# Patient Record
Sex: MALE | Race: WHITE
[De-identification: names, ages, dates, MRNs, and addresses within clinical notes are randomized per-mention and may not be internally consistent; named-entity substitution may affect disease eponyms.]

---

## 2018-08-02 ENCOUNTER — HOSPITAL ENCOUNTER (OUTPATIENT)
Dept: HOSPITAL 74 - JER | Age: 28
Setting detail: OBSERVATION
LOS: 2 days | Discharge: HOME | End: 2018-08-04
Attending: NURSE PRACTITIONER | Admitting: INTERNAL MEDICINE
Payer: MEDICARE

## 2018-08-02 VITALS — BODY MASS INDEX: 27.1 KG/M2

## 2018-08-02 DIAGNOSIS — D72.829: ICD-10-CM

## 2018-08-02 DIAGNOSIS — R11.2: ICD-10-CM

## 2018-08-02 DIAGNOSIS — E87.2: Primary | ICD-10-CM

## 2018-08-02 DIAGNOSIS — K52.9: ICD-10-CM

## 2018-08-02 DIAGNOSIS — R10.9: ICD-10-CM

## 2018-08-02 DIAGNOSIS — F11.90: ICD-10-CM

## 2018-08-02 DIAGNOSIS — F12.90: ICD-10-CM

## 2018-08-02 LAB
ALBUMIN SERPL-MCNC: 4.9 G/DL (ref 3.4–5)
ALP SERPL-CCNC: 121 U/L (ref 45–117)
ALT SERPL-CCNC: 31 U/L (ref 12–78)
ANION GAP SERPL CALC-SCNC: 13 MMOL/L (ref 8–16)
APPEARANCE UR: CLEAR
APTT BLD: 27.1 SECONDS (ref 25.2–36.5)
AST SERPL-CCNC: 11 U/L (ref 15–37)
BASOPHILS # BLD: 0.5 % (ref 0–2)
BILIRUB SERPL-MCNC: 0.3 MG/DL (ref 0.2–1)
BILIRUB UR STRIP.AUTO-MCNC: NEGATIVE MG/DL
BUN SERPL-MCNC: 9 MG/DL (ref 7–18)
CALCIUM SERPL-MCNC: 9.9 MG/DL (ref 8.5–10.1)
CHLORIDE SERPL-SCNC: 109 MMOL/L (ref 98–107)
CO2 SERPL-SCNC: 22 MMOL/L (ref 21–32)
COLOR UR: YELLOW
CREAT SERPL-MCNC: 0.9 MG/DL (ref 0.7–1.3)
DEPRECATED RDW RBC AUTO: 13.5 % (ref 11.9–15.9)
EOSINOPHIL # BLD: 0.1 % (ref 0–4.5)
GLUCOSE SERPL-MCNC: 122 MG/DL (ref 74–106)
HCT VFR BLD CALC: 43.5 % (ref 35.4–49)
HGB BLD-MCNC: 14.6 GM/DL (ref 11.7–16.9)
INR BLD: 1.06 (ref 0.83–1.09)
KETONES UR QL STRIP: (no result)
LEUKOCYTE ESTERASE UR QL STRIP.AUTO: NEGATIVE
LIPASE SERPL-CCNC: 90 U/L (ref 73–393)
LYMPHOCYTES # BLD: 9 % (ref 8–40)
MCH RBC QN AUTO: 29.7 PG (ref 25.7–33.7)
MCHC RBC AUTO-ENTMCNC: 33.5 G/DL (ref 32–35.9)
MCV RBC: 88.8 FL (ref 80–96)
MONOCYTES # BLD AUTO: 4.6 % (ref 3.8–10.2)
MUCOUS THREADS URNS QL MICRO: (no result)
NEUTROPHILS # BLD: 85.8 % (ref 42.8–82.8)
NITRITE UR QL STRIP: NEGATIVE
PH UR: 6 [PH] (ref 5–8)
PLATELET # BLD AUTO: 433 K/MM3 (ref 134–434)
PMV BLD: 8.2 FL (ref 7.5–11.1)
POTASSIUM SERPLBLD-SCNC: 4 MMOL/L (ref 3.5–5.1)
PROT SERPL-MCNC: 8.6 G/DL (ref 6.4–8.2)
PROT UR QL STRIP: (no result)
PROT UR QL STRIP: NEGATIVE
PT PNL PPP: 12 SEC (ref 9.7–13)
RBC # BLD AUTO: 4.9 M/MM3 (ref 4–5.6)
SODIUM SERPL-SCNC: 144 MMOL/L (ref 136–145)
SP GR UR: 1.03 (ref 1–1.03)
UROBILINOGEN UR STRIP-MCNC: NEGATIVE MG/DL (ref 0.2–1)
WBC # BLD AUTO: 19.5 K/MM3 (ref 4–10)

## 2018-08-02 PROCEDURE — G0378 HOSPITAL OBSERVATION PER HR: HCPCS

## 2018-08-02 NOTE — PDOC
Rapid Medical Evaluation


Chief Complaint: Pain, Acute


Time Seen by Provider: 08/02/18 18:22


Medical Evaluation: 


 Allergies











Allergy/AdvReac Type Severity Reaction Status Date / Time


 


No Known Allergies Allergy   Verified 08/02/18 18:18








I have performed a brief in-person evaluation of this patient. 


The patient presents with a chief complaint of:  abdominal pain today


Pertinent physical exam findings: +Markle sign.  +RLQ TTP


I have ordered the following: labs, UA, IV


The patient will proceed to the ED for further evaluation. 











**Discharge Disposition





- Diagnosis


 Abdominal pain








- Referrals





- Patient Instructions





- Post Discharge Activity

## 2018-08-02 NOTE — PDOC
Attending Attestation





- HPI


HPI: 





08/02/18 20:51


Patient is a 28 year old male with no significant past medical history who 

presents to the ED with complaints of diffuse abdominal pain that began earlier 

this evening. Patient reports experiencing gradually increased in intensity 

until he was no longer able to tolerate it, prompting him to come into the ED 

for further evaluation.  He reports experiencing associated symptoms of 

increased nausea, and multiple episodes of vomiting. 





Denies chest pain, Sob. Denies fevers,chill. Denies contact with sick 

individuals, out of state travelling. Denies diarrhea, constipation. Denies any 

other symptoms. 





Allergies: None


Social history: No smoking. No alcohol. No illicit drugs. 


Surgical history: 


PMD: None





Adult PE


General: +Conducted post morphine administration. +Sleeping. No distress. 


Well-nourished well-developed individual, 


HEENT: Throat: Normal, tonsils normal, no erythema or exudate


Neck: Supple, no meningeal signs, no lymphadenopathy


Eyes::Pupils equal reactive and round, extraocular motion intact


Chest: Nontender to palpation 


Cardiac: S1-S2 normal, regular rate and rhythm, no murmurs rubs or gallops


Respiratory: Lungs clear to auscultation bilateral


Abdomen: +Pain resolved.


Soft, nondistended, normal bowel sounds, nontender to palpation diffusely


Extremities: Warm, dry, no cyanosis, clubbing, or edema


Skin: No rashes


Neuro: Alert and oriented x3, nonfocal exam, grossly intact, normal gait


Psych: Normal mood and affect








<FcoJ Luis - Last Filed: 08/02/18 20:51>





- Resident


Resident Name: [facility]





- ED Attending Attestation


I have performed the following: I have examined & evaluated the patient, The 

case was reviewed & discussed with the resident, I agree w/resident's findings 

& plan





- Physicial Exam


PE: 





08/03/18 02:11


see HPI








- Medical Decision Making








08/02/18 22:32





A portion of this note was documented by scribe services under my direction. I 

have reviewed the details of the note, within reason, and agree with the 

documentation.  The case summary and management plan written by me. 





 Assessment and plan: This is a 28-year-old male who comes in complaining of 

abdominal pain. Patient was seen in conjunction with the resident. Patient at 

time of my exam will was feeling much better however he had also received 

morphine.





A workup was obtained including CAT scan of abdomen and pelvis which was 

negative.





Patient's labs were remarkable for an elevated white count with left shift and 

elevated lactic acid.


Patient hydrated with 2 L of normal saline  and lactic acid were repeated with 

improvement to 3.7 which is still abnormal


08/03/18 00:46


Reevaluation patient remains pain-free. Patient on third liter fluid 

posteriorly the flu PP the CBC and lactic acid.








08/03/18 02:08


Patient's lactic acid is still 3.6 so will bring patient into an observation 

telemetry bed and continue to hydrate overnight and hopefully patient will be 

able to be sent home tomorrow.


08/03/18 02:11





<Corinne Willoughby I - Last Filed: 08/03/18 02:12>

## 2018-08-03 LAB
ALBUMIN SERPL-MCNC: 4 G/DL (ref 3.4–5)
ALP SERPL-CCNC: 106 U/L (ref 45–117)
ALT SERPL-CCNC: 27 U/L (ref 12–78)
AMPHET UR-MCNC: NEGATIVE NG/ML
ANION GAP SERPL CALC-SCNC: 10 MMOL/L (ref 8–16)
ANION GAP SERPL CALC-SCNC: 8 MMOL/L (ref 8–16)
AST SERPL-CCNC: 10 U/L (ref 15–37)
BARBITURATES UR-MCNC: NEGATIVE NG/ML
BENZODIAZ UR SCN-MCNC: NEGATIVE NG/ML
BILIRUB SERPL-MCNC: 0.6 MG/DL (ref 0.2–1)
BUN SERPL-MCNC: 7 MG/DL (ref 7–18)
BUN SERPL-MCNC: 7 MG/DL (ref 7–18)
CALCIUM SERPL-MCNC: 8.9 MG/DL (ref 8.5–10.1)
CALCIUM SERPL-MCNC: 9 MG/DL (ref 8.5–10.1)
CHLORIDE SERPL-SCNC: 103 MMOL/L (ref 98–107)
CHLORIDE SERPL-SCNC: 106 MMOL/L (ref 98–107)
CO2 SERPL-SCNC: 26 MMOL/L (ref 21–32)
CO2 SERPL-SCNC: 26 MMOL/L (ref 21–32)
COCAINE UR-MCNC: NEGATIVE NG/ML
CREAT SERPL-MCNC: 0.8 MG/DL (ref 0.7–1.3)
CREAT SERPL-MCNC: 0.8 MG/DL (ref 0.7–1.3)
DEPRECATED RDW RBC AUTO: 13.5 % (ref 11.9–15.9)
GLUCOSE SERPL-MCNC: 119 MG/DL (ref 74–106)
GLUCOSE SERPL-MCNC: 91 MG/DL (ref 74–106)
HCT VFR BLD CALC: 38.2 % (ref 35.4–49)
HGB BLD-MCNC: 13.1 GM/DL (ref 11.7–16.9)
MAGNESIUM SERPL-MCNC: 1.9 MG/DL (ref 1.8–2.4)
MCH RBC QN AUTO: 30.4 PG (ref 25.7–33.7)
MCHC RBC AUTO-ENTMCNC: 34.3 G/DL (ref 32–35.9)
MCV RBC: 88.9 FL (ref 80–96)
METHADONE UR-MCNC: NEGATIVE NG/ML
OPIATES UR QL SCN: POSITIVE NG/ML
PCP UR QL SCN: NEGATIVE NG/ML
PHOSPHATE SERPL-MCNC: 3.4 MG/DL (ref 2.5–4.9)
PLATELET # BLD AUTO: 333 K/MM3 (ref 134–434)
PMV BLD: 8.2 FL (ref 7.5–11.1)
POTASSIUM SERPLBLD-SCNC: 3.6 MMOL/L (ref 3.5–5.1)
POTASSIUM SERPLBLD-SCNC: 3.7 MMOL/L (ref 3.5–5.1)
PROT SERPL-MCNC: 7.2 G/DL (ref 6.4–8.2)
RBC # BLD AUTO: 4.3 M/MM3 (ref 4–5.6)
SODIUM SERPL-SCNC: 139 MMOL/L (ref 136–145)
SODIUM SERPL-SCNC: 140 MMOL/L (ref 136–145)
WBC # BLD AUTO: 17.1 K/MM3 (ref 4–10)

## 2018-08-03 PROCEDURE — 3E0337Z INTRODUCTION OF ELECTROLYTIC AND WATER BALANCE SUBSTANCE INTO PERIPHERAL VEIN, PERCUTANEOUS APPROACH: ICD-10-PCS | Performed by: NURSE PRACTITIONER

## 2018-08-03 PROCEDURE — 3E033GC INTRODUCTION OF OTHER THERAPEUTIC SUBSTANCE INTO PERIPHERAL VEIN, PERCUTANEOUS APPROACH: ICD-10-PCS | Performed by: NURSE PRACTITIONER

## 2018-08-03 PROCEDURE — 3E03329 INTRODUCTION OF OTHER ANTI-INFECTIVE INTO PERIPHERAL VEIN, PERCUTANEOUS APPROACH: ICD-10-PCS | Performed by: NURSE PRACTITIONER

## 2018-08-03 PROCEDURE — 3E033NZ INTRODUCTION OF ANALGESICS, HYPNOTICS, SEDATIVES INTO PERIPHERAL VEIN, PERCUTANEOUS APPROACH: ICD-10-PCS | Performed by: NURSE PRACTITIONER

## 2018-08-03 RX ADMIN — SODIUM CHLORIDE SCH MLS/HR: 9 INJECTION, SOLUTION INTRAVENOUS at 17:29

## 2018-08-03 RX ADMIN — SODIUM CHLORIDE SCH MLS/HR: 9 INJECTION, SOLUTION INTRAVENOUS at 13:33

## 2018-08-03 RX ADMIN — CEFAZOLIN SCH MLS/HR: 1 INJECTION, POWDER, FOR SOLUTION INTRAVENOUS at 05:43

## 2018-08-03 RX ADMIN — CEFAZOLIN SCH MLS/HR: 1 INJECTION, POWDER, FOR SOLUTION INTRAVENOUS at 09:24

## 2018-08-03 NOTE — PN
Teaching Attending Note


Name of Resident: Rafael Maravilla





ATTENDING PHYSICIAN STATEMENT





I saw and evaluated the patient.


I reviewed the resident's note and discussed the case with the resident.


I agree with the resident's findings and plan as documented.








SUBJECTIVE:


Patient is a 28 year old male with no significant PMH who presents for 

evaluation of abdominal pain.  The patient reports acute onset poorly described 

abdominal pain earlier today while at work with associated nausea and multiple 

episodes of non-bilious, non-bloody vomiting prompting his presentation to the 

ED for further evaluation.  The patient denies similar symptoms in the past and 

otherwise denies fevers, chills, SOB, chest pain, or changes with urination or 

bowel movements. However, after eating in the ER his family reported that he 

had chills.





OBJECTIVE:


Alert and in no acute distress





 Vital Signs











 Period  Temp  Pulse  Resp  BP Sys/Wright  Pulse Ox


 


 Last 24 Hr  97.4 F  60-96  18-18  130-136/79-80  100-100











HEENT: No Jaundice, eye redness or discharge, PERRLA, EOMI. Normocephalic, 

atraumatic. External ears are normal and hearing is grossly intact. No nasal 

discharge.


Neck: Supple, nontender. No palpable adenopathy or thyromegaly. No JVD


Chest: Good effort. Clear to auscultation and percussion.


Heart: Regular. No S3, rub or murmur


Abdomen: Not distended, soft, nontender and no HSM. No rebound or guarding.  

Normoactive bowel sounds.


Ext: Peripheral pulses intact. No leg edema.


Skin: Warm and dry. No petechiae, rash or ecchymosis.


Neuro: Alert. Oriented x3. CN 2-12 grossly intact. Sensation grossly intact in 

all four extremities and DTR are symmetric.





 Home Medications











 Medication  Instructions  Recorded


 


NK [No Known Home Medication]  08/03/18








 Abnormal Lab Results











  08/02/18 08/02/18 08/02/18





  19:42 19:42 19:42


 


WBC  19.5 H  


 


Neutrophils %  85.8 H  


 


Chloride   109 H 


 


Random Glucose   122 H 


 


Lactic Acid    4.6 H*


 


AST   11 L 


 


Alkaline Phosphatase   121 H 


 


Total Protein   8.6 H 


 


Urine Protein   


 


Urine Ketones   














  08/02/18 08/02/18 08/03/18





  19:45 22:10 00:45


 


WBC   


 


Neutrophils %   


 


Chloride   


 


Random Glucose   


 


Lactic Acid   3.7 H*  3.5 H*


 


AST   


 


Alkaline Phosphatase   


 


Total Protein   


 


Urine Protein  1+ H  


 


Urine Ketones  Trace H  











ASSESSMENT AND PLAN:


1. Abdominal pain, lactic acidosis and Leukocytosis - Etiology of his symptoms 

is unclear. He is afebrile, CT abdomen shows no abnormality. He works in a 

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 Though he does not look toxic and is 

afebrile, we are concerned about sepsis of unclear source. Will get blood 

cultures, check urine toxicology, hepatitis A serology and treat with IV Ancef 

and Flagyl. Continue IV NS at 150 ml/hour, trend LFTs and lactic acid, and 

consult GI and ID. Check Hba1c for prediabetes.





2. DVT prophylaxis - Heparin 5000u sq tid.      





3. Advance directives - Full code

## 2018-08-03 NOTE — CON.GI
Consult


Consult Specialty:: GI


Reason for Consultation:: Nause, vomiting





- History of Present Illness


History of Present Illness: 





Chart reviewed. Events noted


Per initial ntake: 29 yo male with no significant PMH presents with complaint 

of nausea, vomiting, and diffuse abdominal pain that began a few hours before 

he presented to the ED. He states he had not eaten yet today and had some 

cantaloupe juice. He stated he then felt nauseas, and vomited non blood non 

bilious fluid multiple times for about an hour. He states his pain worsened 

during the vomiting, but improved afterwards. When I saw him, he stated that 

his pain has mostly resolved and that he is no longer nauseas and has not 

vomited since arrival to the ED. Denies fever, chills, headache, cough, chest 

pain, SOB, urinary frequency, dysuria.





ER course was notable for:


(1) WBC 19.5, Lactic acid 4.6 -> 3.5 after 3 L NS


(2) CT noted - no acute abdominal pathology








At the time of this encounter, the pt was not in distress, or discomfort. 

Reported resolution of the above symptoms. No prior history of the same. No 

family, or personal history of IBD, chronic GI issues. No weight loss, melena, 

hematochezia, fever, chills, jaundice. The above symptoms had acute onset. WBC 

and lactate remain elevated, although better. Normal abdominal exam. Tolerating 

diet.





- History Source


History Provided By: Patient, Medical Record





- Alcohol/Substance Use


Hx Alcohol Use: No





- Smoking History


Smoking history: Current every day smoker


Have you smoked in the past 12 months: Yes


Aproximately how many cigarettes per day: 1





Home Medications





- Allergies


Allergies/Adverse Reactions: 


 Allergies











Allergy/AdvReac Type Severity Reaction Status Date / Time


 


No Known Allergies Allergy   Verified 08/02/18 18:18














- Home Medications


Home Medications: 


Ambulatory Orders





NK [No Known Home Medication]  08/03/18 











Family Disease History





- Family Disease History


Family History: Unremarkable





Review of Systems


Findings/Remarks: 





as per hpi, h&p, ed





Physical Exam-GI


Vital Signs: 


 Vital Signs











Temperature  98.3 F   08/03/18 10:00


 


Pulse Rate  73   08/03/18 10:00


 


Respiratory Rate  16   08/03/18 10:00


 


Blood Pressure  136/71   08/03/18 10:00


 


O2 Sat by Pulse Oximetry (%)  100   08/03/18 04:52











Labs: 


 CBC, BMP





 08/03/18 06:00 





 08/03/18 06:00 





 INR, PTT











INR  1.06  (0.83-1.09)   08/02/18  19:30    














Imaging





- Results


Cat Scan: Report Reviewed





Problem List





- Problems


(1) Gastroenteritis


Code(s): K52.9 - NONINFECTIVE GASTROENTERITIS AND COLITIS, UNSPECIFIED   





Assessment/Plan





A28M with acute onset of nausea, vomiting and abdominal pain. Asymptomatic with 

normal abdominal exam.





Acute gastroenteritis





PO hydration. 


?need for abx. 


BRAT diet and observe


CBC in am

## 2018-08-03 NOTE — PN
Progress Note (short form)





- Note


Progress Note: 





ID consult dictated





imp/reccd


28 year old man originally from [facility], history of ETOH use


admitted for sudden onset of vomiting yesterday accompanied by abdominal pain


no diarrhea


no fevers


lactic acid high


leukocytosis


both resolving with IVF


appears well


no further abdominal pain or vomiting





ct scan abp/pelvis no acute process identified-gallbladder OK





suggest 


?gastroenteritis


continue IVF


f/u blood cultures


HIV testing


d/c antibiotics





repeat cbc in am

















Problem List





- Problems


(1) Gastroenteritis


Code(s): K52.9 - NONINFECTIVE GASTROENTERITIS AND COLITIS, UNSPECIFIED   





(2) Lactic acid acidosis


Code(s): E87.2 - ACIDOSIS

## 2018-08-03 NOTE — CONS
INFECTIOUS DISEASE CONSULTATION

 

DATE OF CONSULTATION:  08/03/2018

 

REQUESTING PHYSICIAN:  The hospitalist service.

 

This is a 28-year-old male originally from [facility].  He has been here for 5 years. 

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  He was out

drinking on Tuesday night.  Wednesday, he was off from work.  On Thursday, he arrived

at work, and he had an episode of vomiting.  His restaurant mates gave him some

cantaloupe juice to drink.  He drank the cantaloupe juice and continued to vomit.  He

had severe abdominal pain with it and presented to the emergency room.  There was no

diarrhea.  There were no fevers.  In the ER, he was noted to have leukocytosis and

elevated lactic acid.  He had a CAT scan done that showed no acute process.

 

PAST MEDICAL HISTORY:  Unremarkable.

 

MEDICATIONS:  He takes no medications.

 

He has never been hospitalized.

 

ALLERGIES:  He has no known drug allergies.

 

SOCIAL HISTORY:  He occasionally smokes marijuana, and he drinks alcohol

intermittently.  When he drinks, he drinks 10 beers.  He works in a restaurant.  He

is here from [facility].  He has been here for 5 years.

 

FAMILY HISTORY:  Noncontributory.

 

REVIEW OF SYSTEMS:  He denies HIV risk factors.  He is agreeable to HIV testing.  His

vomiting has resolved as has his abdominal pain.

 

PHYSICAL EXAMINATION:

General:  He is awake and alert.

Vital Signs:  Temperature is 98.1.  Pulse is 74, blood pressure 135/87.  Respiratory

rate is 18.  He is saturating 99% on room air.

HEENT:  He is normocephalic.  His eyes are anicteric.

Neck:  Supple.

Lungs:  Clear to auscultation.

Heart:  Regular rate and rhythm.

Abdomen:  Soft, nontender.

Extremities:  Without edema.

Skin:  He has no rash.

 

LABORATORY DATA:  White count on admission was 19.5.  Hemoglobin was normal as was

platelets.  This morning 17.1.  Lactic acid was as high as 4.6, but has now improved

to 2.2.  His urinalysis is negative, and urine and blood cultures are pending.

 

In summary, this is a 28-year-old man with gastroenteritis with resolved vomiting and

abdominal pain.  I suspect it is a reactive leukocytosis and will resolve.  Cultures

have been sent, which we can follow up.  I would recommend HIV testing and stop his

antibiotics at this time.  Diet to be advanced as tolerated.  Further recommendations

to follow.

 

 

THEODORE MICHAEL M.D.

 

[id number]

DD: 08/03/2018 17:20

DT: 08/03/2018 19:11

Job #:  [id number]

## 2018-08-03 NOTE — HP
<Lotus Hidns - Last Filed: 08/03/18 08:06>


CHIEF COMPLAINT:





PCP:





HISTORY OF PRESENT ILLNESS:








ER course was notable for:


(1)


(2)


(3)





Recent Travel:





PAST MEDICAL HISTORY:





PAST SURGICAL HISTORY:





Social History:


Smoking:


Alcohol:


Drugs: 





Family History:


Allergies





No Known Allergies Allergy (Verified 08/02/18 18:18)


 








HOME MEDICATIONS:


 Home Medications











 Medication  Instructions  Recorded


 


NK [No Known Home Medication]  08/03/18








REVIEW OF SYSTEMS


CONSTITUTIONAL: 


Absent:  fever, chills, diaphoresis, generalized weakness, malaise, loss of 

appetite, weight change


HEENT: 


Absent:  rhinorrhea, nasal congestion, throat pain, throat swelling, difficulty 

swallowing, mouth swelling, ear pain, eye pain, visual changes


CARDIOVASCULAR: 


Absent: chest pain, syncope, palpitations, irregular heart rate, lightheadedness

, peripheral edema


RESPIRATORY: 


Absent: cough, shortness of breath, dyspnea with exertion, orthopnea, wheezing, 

stridor, hemoptysis


GASTROINTESTINAL:


Absent: abdominal pain, abdominal distension, nausea, vomiting, diarrhea, 

constipation, melena, hematochezia


GENITOURINARY: 


Absent: dysuria, frequency, urgency, hesitancy, hematuria, flank pain, genital 

pain


MUSCULOSKELETAL: 


Absent: myalgia, arthralgia, joint swelling, back pain, neck pain


SKIN: 


Absent: rash, itching, pallor


HEMATOLOGIC/IMMUNOLOGIC: 


Absent: easy bleeding, easy bruising, lymphadenopathy, frequent infections


ENDOCRINE:


Absent: unexplained weight gain, unexplained weight loss, heat intolerance, 

cold intolerance


NEUROLOGIC: 


Absent: headache, focal weakness or paresthesias, dizziness, unsteady gait, 

seizure, mental status changes, bladder or bowel incontinence


PSYCHIATRIC: 


Absent: anxiety, depression, suicidal or homicidal ideation, hallucinations.








PHYSICAL EXAMINATION


 Vital Signs - 24 hr











  08/02/18 08/02/18 08/02/18





  18:18 19:30 22:11


 


Temperature 97.4 F L  


 


Pulse Rate 96 H  


 


Pulse Rate [   60





Apical]   


 


Respiratory 18  18





Rate   


 


Blood Pressure 136/79  


 


Blood Pressure   130/80





[Right Arm]   


 


O2 Sat by Pulse 100 100 





Oximetry (%)   














  08/03/18 08/03/18 08/03/18





  03:53 04:52 06:45


 


Temperature 99.1 F  98.6 F


 


Pulse Rate 78  61


 


Pulse Rate [   





Apical]   


 


Respiratory 18 18 18





Rate   


 


Blood Pressure 139/81  123/73


 


Blood Pressure   





[Right Arm]   


 


O2 Sat by Pulse  100 





Oximetry (%)   











GENERAL: Awake, alert, and fully oriented, in no acute distress.


HEAD: Normal with no signs of trauma.


EYES: Pupils equal, round and reactive to light, extraocular movements intact, 

sclera anicteric, conjunctiva clear. No lid lag.


EARS, NOSE, THROAT: Ears normal, nares patent, oropharynx clear without 

exudates. Moist mucous membranes.


NECK: Normal range of motion, supple without lymphadenopathy, JVD, or masses.


LUNGS: Breath sounds equal, clear to auscultation bilaterally. No wheezes, and 

no crackles. No accessory muscle use.


HEART: Regular rate and rhythm, normal S1 and S2 without murmur, rub or gallop.


ABDOMEN: Soft, nontender, not distended, normoactive bowel sounds, no guarding, 

no rebound, no masses.  No hepatomegaly or  splenomegaly. 


MUSCULOSKELETAL: Normal range of motion at all joints. No bony deformities or 

tenderness. No CVA tenderness.


UPPER EXTREMITIES: 2+ pulses, warm, well-perfused. No cyanosis. No clubbing. No 

peripheral edema.


LOWER EXTREMITIES: 2+ pulses, warm, well-perfused. No calf tenderness. No 

peripheral edema. 


NEUROLOGICAL:  Cranial nerves II-XII intact. Normal speech. Normal gait.


PSYCHIATRIC: Cooperative. Good eye contact. Appropriate mood and affect.


SKIN: Warm, dry, normal turgor, no rashes or lesions noted, normal capillary 

refill. 


 Laboratory Results - last 24 hr











  08/02/18 08/02/18 08/02/18





  19:30 19:42 19:42


 


WBC   19.5 H 


 


RBC   4.90 


 


Hgb   14.6 


 


Hct   43.5 


 


MCV   88.8 


 


MCH   29.7 


 


MCHC   33.5 


 


RDW   13.5 


 


Plt Count   433 


 


MPV   8.2 


 


Absolute Neuts (auto)   16.7 


 


Neutrophils %   85.8 H 


 


Lymphocytes %   9.0 


 


Monocytes %   4.6 


 


Eosinophils %   0.1 


 


Basophils %   0.5 


 


Nucleated RBC %   0 


 


PT with INR  12.00  


 


INR  1.06  


 


PTT (Actin FS)  27.1  


 


Sodium    144


 


Potassium    4.0


 


Chloride    109 H


 


Carbon Dioxide    22


 


Anion Gap    13


 


BUN    9


 


Creatinine    0.9


 


Creat Clearance w eGFR    > 60


 


Random Glucose    122 H


 


Lactic Acid   


 


Calcium    9.9


 


Phosphorus   


 


Magnesium   


 


Total Bilirubin    0.3


 


AST    11 L


 


ALT    31


 


Alkaline Phosphatase    121 H


 


Total Protein    8.6 H


 


Albumin    4.9


 


Lipase    90


 


Urine Color   


 


Urine Appearance   


 


Urine pH   


 


Ur Specific Gravity   


 


Urine Protein   


 


Urine Glucose (UA)   


 


Urine Ketones   


 


Urine Blood   


 


Urine Nitrite   


 


Urine Bilirubin   


 


Urine Urobilinogen   


 


Ur Leukocyte Esterase   


 


Urine WBC (Auto)   


 


Urine RBC (Auto)   


 


Urine Mucus   


 


Opiates Screen   


 


Methadone Screen   


 


Barbiturate Screen   


 


Phencyclidine Screen   


 


Ur Amphetamines Screen   


 


MDMA (Ecstasy) Screen   


 


Benzodiazepines Screen   


 


Cocaine Screen   


 


U Marijuana (THC) Screen   














  08/02/18 08/02/18 08/02/18





  19:42 19:45 22:10


 


WBC   


 


RBC   


 


Hgb   


 


Hct   


 


MCV   


 


MCH   


 


MCHC   


 


RDW   


 


Plt Count   


 


MPV   


 


Absolute Neuts (auto)   


 


Neutrophils %   


 


Lymphocytes %   


 


Monocytes %   


 


Eosinophils %   


 


Basophils %   


 


Nucleated RBC %   


 


PT with INR   


 


INR   


 


PTT (Actin FS)   


 


Sodium   


 


Potassium   


 


Chloride   


 


Carbon Dioxide   


 


Anion Gap   


 


BUN   


 


Creatinine   


 


Creat Clearance w eGFR   


 


Random Glucose   


 


Lactic Acid  4.6 H*   3.7 H*


 


Calcium   


 


Phosphorus   


 


Magnesium   


 


Total Bilirubin   


 


AST   


 


ALT   


 


Alkaline Phosphatase   


 


Total Protein   


 


Albumin   


 


Lipase   


 


Urine Color   Yellow 


 


Urine Appearance   Clear 


 


Urine pH   6.0 


 


Ur Specific Gravity   1.030 


 


Urine Protein   1+ H 


 


Urine Glucose (UA)   Negative 


 


Urine Ketones   Trace H 


 


Urine Blood   Negative 


 


Urine Nitrite   Negative 


 


Urine Bilirubin   Negative 


 


Urine Urobilinogen   Negative 


 


Ur Leukocyte Esterase   Negative 


 


Urine WBC (Auto)   1 


 


Urine RBC (Auto)   3 


 


Urine Mucus   Few 


 


Opiates Screen   


 


Methadone Screen   


 


Barbiturate Screen   


 


Phencyclidine Screen   


 


Ur Amphetamines Screen   


 


MDMA (Ecstasy) Screen   


 


Benzodiazepines Screen   


 


Cocaine Screen   


 


U Marijuana (THC) Screen   














  08/03/18 08/03/18 08/03/18





  00:45 02:41 06:00


 


WBC    17.1 H


 


RBC    4.30


 


Hgb    13.1


 


Hct    38.2


 


MCV    88.9


 


MCH    30.4


 


MCHC    34.3


 


RDW    13.5


 


Plt Count    333  D


 


MPV    8.2


 


Absolute Neuts (auto)   


 


Neutrophils %   


 


Lymphocytes %   


 


Monocytes %   


 


Eosinophils %   


 


Basophils %   


 


Nucleated RBC %   


 


PT with INR   


 


INR   


 


PTT (Actin FS)   


 


Sodium   


 


Potassium   


 


Chloride   


 


Carbon Dioxide   


 


Anion Gap   


 


BUN   


 


Creatinine   


 


Creat Clearance w eGFR   


 


Random Glucose   


 


Lactic Acid  3.5 H*  


 


Calcium   


 


Phosphorus   


 


Magnesium   


 


Total Bilirubin   


 


AST   


 


ALT   


 


Alkaline Phosphatase   


 


Total Protein   


 


Albumin   


 


Lipase   


 


Urine Color   


 


Urine Appearance   


 


Urine pH   


 


Ur Specific Gravity   


 


Urine Protein   


 


Urine Glucose (UA)   


 


Urine Ketones   


 


Urine Blood   


 


Urine Nitrite   


 


Urine Bilirubin   


 


Urine Urobilinogen   


 


Ur Leukocyte Esterase   


 


Urine WBC (Auto)   


 


Urine RBC (Auto)   


 


Urine Mucus   


 


Opiates Screen   Positive 


 


Methadone Screen   Negative 


 


Barbiturate Screen   Negative 


 


Phencyclidine Screen   Negative 


 


Ur Amphetamines Screen   Negative 


 


MDMA (Ecstasy) Screen   Negative 


 


Benzodiazepines Screen   Negative 


 


Cocaine Screen   Negative 


 


U Marijuana (THC) Screen   Positive 














  08/03/18





  06:00


 


WBC 


 


RBC 


 


Hgb 


 


Hct 


 


MCV 


 


MCH 


 


MCHC 


 


RDW 


 


Plt Count 


 


MPV 


 


Absolute Neuts (auto) 


 


Neutrophils % 


 


Lymphocytes % 


 


Monocytes % 


 


Eosinophils % 


 


Basophils % 


 


Nucleated RBC % 


 


PT with INR 


 


INR 


 


PTT (Actin FS) 


 


Sodium  139


 


Potassium  3.6


 


Chloride  103


 


Carbon Dioxide  26


 


Anion Gap  10


 


BUN  7


 


Creatinine  0.8


 


Creat Clearance w eGFR  > 60


 


Random Glucose  91  D


 


Lactic Acid 


 


Calcium  9.0


 


Phosphorus  3.4


 


Magnesium  1.9


 


Total Bilirubin 


 


AST 


 


ALT 


 


Alkaline Phosphatase 


 


Total Protein 


 


Albumin 


 


Lipase 


 


Urine Color 


 


Urine Appearance 


 


Urine pH 


 


Ur Specific Gravity 


 


Urine Protein 


 


Urine Glucose (UA) 


 


Urine Ketones 


 


Urine Blood 


 


Urine Nitrite 


 


Urine Bilirubin 


 


Urine Urobilinogen 


 


Ur Leukocyte Esterase 


 


Urine WBC (Auto) 


 


Urine RBC (Auto) 


 


Urine Mucus 


 


Opiates Screen 


 


Methadone Screen 


 


Barbiturate Screen 


 


Phencyclidine Screen 


 


Ur Amphetamines Screen 


 


MDMA (Ecstasy) Screen 


 


Benzodiazepines Screen 


 


Cocaine Screen 


 


U Marijuana (THC) Screen 











ASSESSMENT/PLAN:








Hospitalist Screening





- Colonoscopy Questionnaire


Colonoscopy Questionnaire: 





Colonoscopy Questionnaire








<Rafael Maravilla - Last Filed: 08/05/18 16:48>


CHIEF COMPLAINT: nausea, vomiting, abdominal pain





PCP:





HISTORY OF PRESENT ILLNESS:


27 yo male with no significant PMH presents with complaint of nausea, vomiting, 

and diffuse abdominal pain that began a few hours before he presented to the 

ED. He states he had not eaten yet today and had some cantaloupe juice. He 

stated he then felt nauseas, and vomited non blood non bilious fluid multiple 

times for about an hour. He states his pain worsened during the vomiting, but 

improved afterwards. When I saw him, he stated that his pain has mostly 

resolved and that he is no longer nauseas and has not vomited since arrival to 

the ED. Denies fever, chills, headache, cough, chest pain, SOB, urinary 

frequency, dysuria.





ER course was notable for:


(1) WBC 19.5, Lactic acid 4.6 -> 3.5 after 3 L NS


(2) CT noted - no acute abdominal pathology


(3) 





Recent Travel: none





PAST MEDICAL HISTORY:


None reported


PAST SURGICAL HISTORY:


None reported


Social History:


Smoking: states he occasionally smokes cigarettes


Alcohol: social, a few drinks per week


Drugs: Marijuana use, denies any other drug use





Family History:


Allergies





No Known Allergies Allergy (Verified 08/02/18 18:18)


 








HOME MEDICATIONS:


 Home Medications











 Medication  Instructions  Recorded


 


NK [No Known Home Medication]  08/03/18








REVIEW OF SYSTEMS


CONSTITUTIONAL: 


Absent:  fever, chills, diaphoresis, generalized weakness, malaise, loss of 

appetite, weight change


HEENT: 


Absent:  rhinorrhea, nasal congestion, throat pain, throat swelling, difficulty 

swallowing, mouth swelling, ear pain, eye pain, visual changes


CARDIOVASCULAR: 


Absent: chest pain, syncope, palpitations, irregular heart rate, lightheadedness

, peripheral edema


RESPIRATORY: 


Absent: cough, shortness of breath, dyspnea with exertion, orthopnea, wheezing, 

stridor, hemoptysis


GASTROINTESTINAL:


Absent: abdominal pain, abdominal distension, nausea, vomiting, diarrhea, 

constipation, melena, hematochezia


GENITOURINARY: 


Absent: dysuria, frequency, urgency, hesitancy, hematuria, flank pain, genital 

pain


MUSCULOSKELETAL: 


Absent: myalgia, arthralgia, joint swelling, back pain, neck pain


SKIN: 


Absent: rash, itching, pallor


HEMATOLOGIC/IMMUNOLOGIC: 


Absent: easy bleeding, easy bruising, lymphadenopathy, frequent infections


ENDOCRINE:


Absent: unexplained weight gain, unexplained weight loss, heat intolerance, 

cold intolerance


NEUROLOGIC: 


Absent: headache, focal weakness or paresthesias, dizziness, unsteady gait, 

seizure, mental status changes, bladder or bowel incontinence


PSYCHIATRIC: 


Absent: anxiety, depression, suicidal or homicidal ideation, hallucinations.








PHYSICAL EXAMINATION


 Vital Signs - 24 hr











  08/02/18 08/02/18 08/02/18





  18:18 19:30 22:11


 


Temperature 97.4 F L  


 


Pulse Rate 96 H  


 


Pulse Rate [   60





Apical]   


 


Respiratory 18  18





Rate   


 


Blood Pressure 136/79  


 


Blood Pressure   130/80





[Right Arm]   


 


O2 Sat by Pulse 100 100 





Oximetry (%)   














  08/03/18





  03:53


 


Temperature 99.1 F


 


Pulse Rate 78


 


Pulse Rate [ 





Apical] 


 


Respiratory 18





Rate 


 


Blood Pressure 139/81


 


Blood Pressure 





[Right Arm] 


 


O2 Sat by Pulse 





Oximetry (%) 











GENERAL: A&O, no acute distress


HEAD: Normocephalic, atraumatic.


EYES: PERRL, EOMI, no scleral icterus


EARS, NOSE, THROAT: oropharynx clear without exudates. Moist mucous membranes.


NECK: supple without lymphadenopathy


LUNGS: CTA b/l, no crackles or wheezes


HEART: Regular rate and rhythm, normal S1 and S2 without murmur, rub or gallop.


ABDOMEN: Soft nontender to palpation, normoactive bowel sounds


MUSCULOSKELETAL: No bony deformities or tenderness. No CVA tenderness.


UPPER EXTREMITIES: 2+ pulses, warm, well-perfused. No cyanosis. No clubbing. No 

peripheral edema.


LOWER EXTREMITIES: 2+ pulses, warm, well-perfused. No calf tenderness. No 

peripheral edema. 


NEUROLOGICAL:  Cranial nerves II-XII grossly intact. Normal speech. Normal gait 

observed


PSYCHIATRIC: Cooperative. Good eye contact. Appropriate mood and affect.


SKIN: Warm, dry, normal turgor, no rashes or lesions noted


 Laboratory Results - last 24 hr











  08/02/18 08/02/18 08/02/18





  19:30 19:42 19:42


 


WBC   19.5 H 


 


RBC   4.90 


 


Hgb   14.6 


 


Hct   43.5 


 


MCV   88.8 


 


MCH   29.7 


 


MCHC   33.5 


 


RDW   13.5 


 


Plt Count   433 


 


MPV   8.2 


 


Absolute Neuts (auto)   16.7 


 


Neutrophils %   85.8 H 


 


Lymphocytes %   9.0 


 


Monocytes %   4.6 


 


Eosinophils %   0.1 


 


Basophils %   0.5 


 


Nucleated RBC %   0 


 


PT with INR  12.00  


 


INR  1.06  


 


PTT (Actin FS)  27.1  


 


Sodium    144


 


Potassium    4.0


 


Chloride    109 H


 


Carbon Dioxide    22


 


Anion Gap    13


 


BUN    9


 


Creatinine    0.9


 


Creat Clearance w eGFR    > 60


 


Random Glucose    122 H


 


Lactic Acid   


 


Calcium    9.9


 


Total Bilirubin    0.3


 


AST    11 L


 


ALT    31


 


Alkaline Phosphatase    121 H


 


Total Protein    8.6 H


 


Albumin    4.9


 


Lipase    90


 


Urine Color   


 


Urine Appearance   


 


Urine pH   


 


Ur Specific Gravity   


 


Urine Protein   


 


Urine Glucose (UA)   


 


Urine Ketones   


 


Urine Blood   


 


Urine Nitrite   


 


Urine Bilirubin   


 


Urine Urobilinogen   


 


Ur Leukocyte Esterase   


 


Urine WBC (Auto)   


 


Urine RBC (Auto)   


 


Urine Mucus   


 


Opiates Screen   


 


Methadone Screen   


 


Barbiturate Screen   


 


Phencyclidine Screen   


 


Ur Amphetamines Screen   


 


MDMA (Ecstasy) Screen   


 


Benzodiazepines Screen   


 


Cocaine Screen   


 


U Marijuana (THC) Screen   














  08/02/18 08/02/18 08/02/18





  19:42 19:45 22:10


 


WBC   


 


RBC   


 


Hgb   


 


Hct   


 


MCV   


 


MCH   


 


MCHC   


 


RDW   


 


Plt Count   


 


MPV   


 


Absolute Neuts (auto)   


 


Neutrophils %   


 


Lymphocytes %   


 


Monocytes %   


 


Eosinophils %   


 


Basophils %   


 


Nucleated RBC %   


 


PT with INR   


 


INR   


 


PTT (Actin FS)   


 


Sodium   


 


Potassium   


 


Chloride   


 


Carbon Dioxide   


 


Anion Gap   


 


BUN   


 


Creatinine   


 


Creat Clearance w eGFR   


 


Random Glucose   


 


Lactic Acid  4.6 H*   3.7 H*


 


Calcium   


 


Total Bilirubin   


 


AST   


 


ALT   


 


Alkaline Phosphatase   


 


Total Protein   


 


Albumin   


 


Lipase   


 


Urine Color   Yellow 


 


Urine Appearance   Clear 


 


Urine pH   6.0 


 


Ur Specific Gravity   1.030 


 


Urine Protein   1+ H 


 


Urine Glucose (UA)   Negative 


 


Urine Ketones   Trace H 


 


Urine Blood   Negative 


 


Urine Nitrite   Negative 


 


Urine Bilirubin   Negative 


 


Urine Urobilinogen   Negative 


 


Ur Leukocyte Esterase   Negative 


 


Urine WBC (Auto)   1 


 


Urine RBC (Auto)   3 


 


Urine Mucus   Few 


 


Opiates Screen   


 


Methadone Screen   


 


Barbiturate Screen   


 


Phencyclidine Screen   


 


Ur Amphetamines Screen   


 


MDMA (Ecstasy) Screen   


 


Benzodiazepines Screen   


 


Cocaine Screen   


 


U Marijuana (THC) Screen   














  08/03/18 08/03/18





  00:45 02:41


 


WBC  


 


RBC  


 


Hgb  


 


Hct  


 


MCV  


 


MCH  


 


MCHC  


 


RDW  


 


Plt Count  


 


MPV  


 


Absolute Neuts (auto)  


 


Neutrophils %  


 


Lymphocytes %  


 


Monocytes %  


 


Eosinophils %  


 


Basophils %  


 


Nucleated RBC %  


 


PT with INR  


 


INR  


 


PTT (Actin FS)  


 


Sodium  


 


Potassium  


 


Chloride  


 


Carbon Dioxide  


 


Anion Gap  


 


BUN  


 


Creatinine  


 


Creat Clearance w eGFR  


 


Random Glucose  


 


Lactic Acid  3.5 H* 


 


Calcium  


 


Total Bilirubin  


 


AST  


 


ALT  


 


Alkaline Phosphatase  


 


Total Protein  


 


Albumin  


 


Lipase  


 


Urine Color  


 


Urine Appearance  


 


Urine pH  


 


Ur Specific Gravity  


 


Urine Protein  


 


Urine Glucose (UA)  


 


Urine Ketones  


 


Urine Blood  


 


Urine Nitrite  


 


Urine Bilirubin  


 


Urine Urobilinogen  


 


Ur Leukocyte Esterase  


 


Urine WBC (Auto)  


 


Urine RBC (Auto)  


 


Urine Mucus  


 


Opiates Screen   Positive


 


Methadone Screen   Negative


 


Barbiturate Screen   Negative


 


Phencyclidine Screen   Negative


 


Ur Amphetamines Screen   Negative


 


MDMA (Ecstasy) Screen   Negative


 


Benzodiazepines Screen   Negative


 


Cocaine Screen   Negative


 


U Marijuana (THC) Screen   Positive











ASSESSMENT/PLAN:


27 yo male with no PMH placed in observation with abdominal pain, n/v, elevated 

lactate and elevated WBC count, pain, n/v have mostly resolved.





Leukocytosis and Elevated lactate


-Unknown cause, could possibly be picture of sepsis with Bacterial GI source in 

young otherwise healthy male


-WBC 19.5, Lactate 3.5 after 3 L NS


-Blood c/s stat


-Ancef 1 gm IV Q8


-Flagyl 500 mg IV Q8


-ID and GI consult placed


-Trend Lactic acid


-Hep A panel





Abdominal pain/nausea/vomiting


-mostly resolved


-Zofran PRN





Drug use


-Marijuana + and Opiate + on urine tox screen


- pt about illicit drug use





DVT Prophylaxis


-Early ambulation





FEN


-Fluids: NS @ 100 cc/hr


-Electrolytes: no abnormalities, BMP, Mg, Phos in AM


-Nutrition: regular diet





Dispo


Observation





Visit type





- Emergency Visit


Emergency Visit: Yes


ED Registration Date: 08/03/18


Care time: The patient presented to the Emergency Department on the above date 

and was hospitalized for further evaluation of their emergent condition.





- New Patient


This patient is new to me today: Yes


Date on this admission: 08/05/18





- Critical Care


Critical Care patient: No





Hospitalist Screening





- Colonoscopy Questionnaire


Colonoscopy Questionnaire: 





Colonoscopy Questionnaire








-   Patient:


50 - 75 years old and never had a screening colonoscopy: No


History of colon or rectal polyps, or CA: No


History of IBD, Crohn's disease or UC: No


History of abdominal radiation therapy as a child: No





-   Relative:


1 with colon or rectal CA, or polyps at age 60 or younger: No


Colon or rectal CA diagnosed at age 45 or younger: No


Multiple relatives with colon or rectal CA: No





-   Outcome:


Screening Result: Negative Screen

## 2018-08-04 VITALS — HEART RATE: 56 BPM | DIASTOLIC BLOOD PRESSURE: 86 MMHG | TEMPERATURE: 98.2 F | SYSTOLIC BLOOD PRESSURE: 143 MMHG

## 2018-08-04 LAB
ALBUMIN SERPL-MCNC: 3.8 G/DL (ref 3.4–5)
ALP SERPL-CCNC: 96 U/L (ref 45–117)
ALT SERPL-CCNC: 27 U/L (ref 12–78)
ANION GAP SERPL CALC-SCNC: 7 MMOL/L (ref 8–16)
AST SERPL-CCNC: 14 U/L (ref 15–37)
BASOPHILS # BLD: 0.7 % (ref 0–2)
BILIRUB SERPL-MCNC: 0.6 MG/DL (ref 0.2–1)
BUN SERPL-MCNC: 7 MG/DL (ref 7–18)
CALCIUM SERPL-MCNC: 8.9 MG/DL (ref 8.5–10.1)
CHLORIDE SERPL-SCNC: 105 MMOL/L (ref 98–107)
CO2 SERPL-SCNC: 27 MMOL/L (ref 21–32)
CREAT SERPL-MCNC: 0.7 MG/DL (ref 0.7–1.3)
DEPRECATED RDW RBC AUTO: 13.3 % (ref 11.9–15.9)
EOSINOPHIL # BLD: 1.9 % (ref 0–4.5)
GLUCOSE SERPL-MCNC: 93 MG/DL (ref 74–106)
HCT VFR BLD CALC: 37.4 % (ref 35.4–49)
HGB BLD-MCNC: 13 GM/DL (ref 11.7–16.9)
LYMPHOCYTES # BLD: 29.7 % (ref 8–40)
MAGNESIUM SERPL-MCNC: 2.1 MG/DL (ref 1.8–2.4)
MCH RBC QN AUTO: 30.5 PG (ref 25.7–33.7)
MCHC RBC AUTO-ENTMCNC: 34.7 G/DL (ref 32–35.9)
MCV RBC: 88 FL (ref 80–96)
MONOCYTES # BLD AUTO: 8.8 % (ref 3.8–10.2)
NEUTROPHILS # BLD: 58.9 % (ref 42.8–82.8)
PLATELET # BLD AUTO: 327 K/MM3 (ref 134–434)
PMV BLD: 7.9 FL (ref 7.5–11.1)
POTASSIUM SERPLBLD-SCNC: 4.2 MMOL/L (ref 3.5–5.1)
PROT SERPL-MCNC: 7 G/DL (ref 6.4–8.2)
RBC # BLD AUTO: 4.25 M/MM3 (ref 4–5.6)
SODIUM SERPL-SCNC: 139 MMOL/L (ref 136–145)
WBC # BLD AUTO: 10.1 K/MM3 (ref 4–10)

## 2018-08-04 RX ADMIN — SODIUM CHLORIDE SCH MLS/HR: 9 INJECTION, SOLUTION INTRAVENOUS at 13:44

## 2018-08-04 RX ADMIN — SODIUM CHLORIDE SCH MLS/HR: 9 INJECTION, SOLUTION INTRAVENOUS at 07:30

## 2018-08-04 NOTE — DS
Physical Exam: 


SUBJECTIVE: Patient seen and examined








OBJECTIVE:





 Vital Signs











 Period  Temp  Pulse  Resp  BP Sys/Wright  Pulse Ox


 


 Last 24 Hr  98.1 F-98.9 F  54-74  18-18  [id number]  








PHYSICAL EXAM





GENERAL: The patient is awake, alert, and fully oriented, in no acute distress.


HEAD: Normal with no signs of trauma.


EYES: PERRL, extraocular movements intact, sclera anicteric, conjunctiva clear. 


ENT: Ears normal, nares patent, oropharynx clear without exudates, moist mucous 

membranes.


NECK: Trachea midline, full range of motion, supple. 


LUNGS: Breath sounds equal, clear to auscultation bilaterally, no wheezes, no 

crackles, no accessory muscle use. 


HEART: Regular rate and rhythm, S1, S2 without murmur, rub or gallop.


ABDOMEN: Soft, nontender, nondistended, normoactive bowel sounds, no guarding, 

no rebound, no hepatosplenomegaly, no masses.


EXTREMITIES: 2+ pulses, warm, well-perfused, no edema. 


NEUROLOGICAL: Cranial nerves II through XII grossly intact. Normal speech, gait 

not observed.


PSYCH: Normal mood, normal affect.


SKIN: Warm, dry, normal turgor, no rashes or lesions noted.





LABS


 Laboratory Results - last 24 hr











  08/03/18 08/03/18 08/03/18





  07:25 18:10 18:10


 


WBC   


 


RBC   


 


Hgb   


 


Hct   


 


MCV   


 


MCH   


 


MCHC   


 


RDW   


 


Plt Count   


 


MPV   


 


Absolute Neuts (auto)   


 


Neutrophils %   


 


Lymphocytes %   


 


Monocytes %   


 


Eosinophils %   


 


Basophils %   


 


Nucleated RBC %   


 


Sodium   140 


 


Potassium   3.7 


 


Chloride   106 


 


Carbon Dioxide   26 


 


Anion Gap   8 


 


BUN   7 


 


Creatinine   0.8 


 


Creat Clearance w eGFR   > 60 


 


Random Glucose   119 H D 


 


Hemoglobin A1c %   


 


Lactic Acid    2.7 H*


 


Calcium   8.9 


 


Magnesium   


 


Total Bilirubin   0.6 


 


AST   10 L 


 


ALT   27 


 


Alkaline Phosphatase   106  D 


 


Creatine Kinase   104 


 


Total Protein   7.2 


 


Albumin   4.0 


 


Hep A IgM Ab Confirm  Negative  


 


Hepatitis A Ab Total  Positive H  














  08/04/18 08/04/18 08/04/18





  06:00 06:00 06:00


 


WBC  10.1 H  


 


RBC  4.25  


 


Hgb  13.0  


 


Hct  37.4  


 


MCV  88.0  


 


MCH  30.5  


 


MCHC  34.7  


 


RDW  13.3  


 


Plt Count  327  


 


MPV  7.9  


 


Absolute Neuts (auto)  5.9  


 


Neutrophils %  58.9  D  


 


Lymphocytes %  29.7  D  


 


Monocytes %  8.8  D  


 


Eosinophils %  1.9  D  


 


Basophils %  0.7  


 


Nucleated RBC %  0  


 


Sodium   139 


 


Potassium   4.2 


 


Chloride   105 


 


Carbon Dioxide   27 


 


Anion Gap   7 L 


 


BUN   7 


 


Creatinine   0.7 


 


Creat Clearance w eGFR   > 60 


 


Random Glucose   93  D 


 


Hemoglobin A1c %   


 


Lactic Acid    1.3


 


Calcium   8.9 


 


Magnesium   2.1 


 


Total Bilirubin   0.6 


 


AST   14 L D 


 


ALT   27 


 


Alkaline Phosphatase   96  D 


 


Creatine Kinase   


 


Total Protein   7.0 


 


Albumin   3.8 


 


Hep A IgM Ab Confirm   


 


Hepatitis A Ab Total   














  08/04/18





  06:00


 


WBC 


 


RBC 


 


Hgb 


 


Hct 


 


MCV 


 


MCH 


 


MCHC 


 


RDW 


 


Plt Count 


 


MPV 


 


Absolute Neuts (auto) 


 


Neutrophils % 


 


Lymphocytes % 


 


Monocytes % 


 


Eosinophils % 


 


Basophils % 


 


Nucleated RBC % 


 


Sodium 


 


Potassium 


 


Chloride 


 


Carbon Dioxide 


 


Anion Gap 


 


BUN 


 


Creatinine 


 


Creat Clearance w eGFR 


 


Random Glucose 


 


Hemoglobin A1c %  5.4


 


Lactic Acid 


 


Calcium 


 


Magnesium 


 


Total Bilirubin 


 


AST 


 


ALT 


 


Alkaline Phosphatase 


 


Creatine Kinase 


 


Total Protein 


 


Albumin 


 


Hep A IgM Ab Confirm 


 


Hepatitis A Ab Total 











HOSPITAL COURSE:





Date of Admission:08/03/18





Date of Discharge: 08/04/18











Minutes to complete discharge: 35





Discharge Summary


Reason For Visit: ABDOMINAL PAIN LACTIC ACIDOSIS


Current Active Problems





Abdominal pain (Acute)


Gastroenteritis (Acute)


Lactic acid acidosis (Acute)








Condition: Improved





- Instructions


Diet, Activity, Other Instructions: 


Drink plenty of fluids. Be sure to wash your hands frequently and always after 

going to the bathroom.





Return to the emergency department for any new or worsening symptoms. 








Disposition: HOME





- Home Medications


Comprehensive Discharge Medication List: 


Ambulatory Orders





NK [No Known Home Medication]  08/03/18 








This patient is new to me today: No


Emergency Visit: Yes


ED Registration Date: 08/03/18


Care time: The patient presented to the Emergency Department on the above date 

and was hospitalized for further evaluation of their emergent condition.


Critical Care patient: No





- Discharge Referral


Referred to [facility] No

## 2018-08-04 NOTE — EKG
Test Reason : 

Blood Pressure : ***/*** mmHG

Vent. Rate : 066 BPM     Atrial Rate : 066 BPM

   P-R Int : 134 ms          QRS Dur : 102 ms

    QT Int : 400 ms       P-R-T Axes : 062 069 051 degrees

   QTc Int : 419 ms

 

NORMAL SINUS RHYTHM WITH SINUS ARRHYTHMIA

NORMAL ECG

NO PREVIOUS ECGS AVAILABLE

Confirmed by VAMSHI WILLIS, KASEY ([id number]) on 8/4/2018 9:13:43 AM

 

Referred By:             Confirmed By:KASEY BONDS MD